# Patient Record
(demographics unavailable — no encounter records)

---

## 2024-10-10 NOTE — HISTORY OF PRESENT ILLNESS
[FreeTextEntry1] : fall [de-identified] :  67 yo f w/ diabetes, htn, osteoporosis here for fall  on satuday was going down the stairs and missed the last two step and fell on knees no tdizzy - no issue since fall.

## 2024-10-10 NOTE — HISTORY OF PRESENT ILLNESS
[FreeTextEntry1] : fall [de-identified] :  65 yo f w/ diabetes, htn, osteoporosis here for fall  on satuday was going down the stairs and missed the last two step and fell on knees no tdizzy - no issue since fall.

## 2024-10-10 NOTE — PLAN
[FreeTextEntry1] :   ====== chart reviewed in detail since last visit ==========   [] f/u urology for urinary frequncy [] f/u endo for osteoporosis and diabetes

## 2024-12-19 NOTE — PHYSICAL EXAM
[Normal Venous Pressure] : normal venous pressure [No Carotid Bruit] : no carotid bruit [Normal S1, S2] : normal S1, S2 [No Murmur] : no murmur [No Rub] : no rub [Normal] : clear lung fields, good air entry, no respiratory distress [Soft] : abdomen soft [Non Tender] : non-tender [Normal Gait] : normal gait [No Edema] : no edema [No Rash] : no rash [Moves all extremities] : moves all extremities [Alert and Oriented] : alert and oriented [de-identified] : good heart sounds

## 2025-04-29 NOTE — HISTORY OF PRESENT ILLNESS
[FreeTextEntry1] : All notes reviewed Medication without change  [de-identified] : Very active. Exercise

## 2025-04-29 NOTE — HEALTH RISK ASSESSMENT
[Good] : ~his/her~  mood as  good [No] : No [No falls in past year] : Patient reported no falls in the past year [0] : 2) Feeling down, depressed, or hopeless: Not at all (0) [Never] : Never [Patient reported mammogram was normal] : Patient reported mammogram was normal [Patient reported colonoscopy was normal] : Patient reported colonoscopy was normal [HIV test declined] : HIV test declined [Hepatitis C test declined] : Hepatitis C test declined [facility] : [facility] [[id number]] : 0 [MammogramDate] : 12/2024 [PapSmearComments] : [place]  [ColonoscopyDate] : 2023

## 2025-04-29 NOTE — ASSESSMENT
[FreeTextEntry1] : Stable exam No change in current meds Labs  [Vaccines Reviewed] : Immunizations reviewed today. Please see immunization details in the vaccine log within the immunization flowsheet.

## 2025-04-29 NOTE — REVIEW OF SYSTEMS
[Fatigue] : no fatigue [Fever] : no fever [Chills] : no chills [Chest Pain] : no chest pain [Shortness Of Breath] : no shortness of breath [Nausea] : no nausea [Constipation] : no constipation [Vomiting] : no vomiting [Diarrhea] : no diarrhea

## 2025-04-29 NOTE — ASSESSMENT
[FreeTextEntry1] : 1. Type 2 Diabetes Mellitus - Current A1c is 7.0%, which is at the upper limit of acceptable control.  - Will continue current regimen with Ozempic 0.25 mg every other week and metformin 1,000 mg twice daily.  - Discussed that diabetes management often requires adjustment over time as pancreatic function naturally declines. Discussed potential alternative medication option of switching to Janumet (combination of Januvia and metformin) as a possible future substitution for current regimen if needed.  - Advised on dietary modifications, including moderating fruit intake while maintaining quality of life. - Discussed that almonds and cheese should have minimal impact on blood glucose levels. - Microvascular complications:     > Neuropathy: up to date with foot exam.     > Nephropathy: Check albumin/creatinine ratio.      > Retinopathy: Last dilated eye exam on 4/29/25, no retinopathy.   2. Osteoporosis - Currently on alendronate 70 mg weekly.  - Plan for repeat bone density scan next year to monitor response to treatment - Recommended calcium and vitamin D supplementation. - Fall precautions.   RTC in 3 months

## 2025-04-29 NOTE — HISTORY OF PRESENT ILLNESS
[FreeTextEntry1] : Ms. VERÓNICA HAMPTON is a 67-year-old woman with type 2 diabetes mellitus and osteoporosis who presents to the clinic to establish care.  DIABETES HISTORY - Age at diagnosis: . - Symptoms at the time of diagnosis: Didn't feel well, went to the hospital and was found to have diabetes in the setting of pneumonia.        > She initially established care with Dr. Elba Travis. The patient was told to lose weight and Ozempic was started. The patient started Ozempic in 2023 and started to lose weight. She followed up in 2023 and had concerns about weight loss. The patient said that the weight loss caused her to alter her clothes and that was expensive for her. She established care with Dr. Bettina eRynolds in 2024 and at that time her A1C was at goal at 6.8%. Hence, the dose of Ozempic was decreased as low as 0.25 mg every other week.  - Current Therapy: Metformin 500 mg twice daily and Ozempic 0.25 mg every other week.       > Ozempic initially increased up to 0.5 mg weekly, but later decreased to 0.25 mg weekly, and later to every other week.        > Patient states that the intention was for her to discontinue Ozempic eventually.  - History of other regimens: Denies. - History of hypoglycemia: Denies. - History of DKA/HHS: Denies. - Complications: Last dilated eye exam today (25), no retinopathy. No neuropathy symptoms.  - Home FSG:       > Fastin-110s mg/dL.       > 1-hour postprandial after dinner: 142-150 mg/dL. - Diet:       > Breakfast: 1 slice of whole wheat bread, tuna salad and tomato; fruit (berries).       > Lunch: Salad (kale, mixed greens, carrots, cucumbers, peas, bell peppers, avocado, chicken or shrimp, olive oil, +/- walnut or cranberry).        > Dinner: Leftover from lunch.       > Snacks: Denies.       > No juice or soda.        > No sweets.   OSTEOPOROSIS  - The patient was diagnosed with osteoporosis in 2024. Started on alendronate at that time.  - Initial DXA scan (2024) showing:      > Lumbar spine T-score of -2.8.      > Femoral neck T-score of -1.7.      > Total hip T-score of -1.0. - Previous fracture Denies. - Alcohol (3 or more units/day): Denies. - Current smoking: Denies. - Glucocorticoid use: Denies - Rheumatoid arthritis: Denies. - Parent fractured hip: Denies. - FRAX score: Major osteoporotic 9.2%. Hip fracture 1.3%. - Menopause: 50-years-old. - History of cancer/radiation therapy: Denies. - Last dental exam: Two weeks ago, had two crowns placed because she cracked her teeth with a [employer] cracker. - Last fall: Summer 2024, going down steps and missed the last two, fell on her knees and hands; no injuries. - GERD: Denies.

## 2025-06-10 NOTE — PHYSICAL EXAM
[de-identified] :  Physical Exam: General: patient is well developed, well nourished, in no acute distress, alert and oriented x 3. Mood and affect: normal Respiratory: no respiratory distress noted Skin: no scars over spine, skin intact, no erythema, no increased warmth Alignment: Loss of cervical lordosis Palpation: + tenderness to palpation cervical spine and paraspinal region Range of motion: limited terminal right and left rotation, limited terminal extension Neurologic Exam: Motor: Manual Muscle testing in the lower extremities is 5 out of 5 in all muscle groups. There is no evidence of muscular atrophy in the lower extremities. Sensory: Sensation to light touch is grossly intact in the lower extremities Reflexes: intact  Shoulder exam: unremarkable Special tests: Neg. Spurling's Vascular: Examination of the peripheral vascular system demonstrates no evidence of congestion or edema. no evidence of lymphedema bilateral upper extremities, pulses are present and symmetric in both upper extremities.  [de-identified] : XR 06/10/2025 (my read): multilevel mild degenerative changes, no instability on flex ex

## 2025-06-10 NOTE — HISTORY OF PRESENT ILLNESS
[de-identified] : Patient Dedra Nathan 67-year-old Female presenting for neck pain x 2mos. Pain is localized to lower cervical region and is provoked by long periods of sitting. Pt denies any prior neck injury or changes in physical activity. She denies any upper extremity weakness, or paresthesia. She believed her symptoms may be related to her workstation at her job. She has had no formal treatment for this condition besides topical analgesics.

## 2025-06-10 NOTE — DISCUSSION/SUMMARY
[de-identified] : Neck pain x 2 months, mild degenerative cervical changes on x-ray -meloxicam x 1 month BID standing -PT Rx given -f/u if needed, if still painful would get MRI cervical non contrast